# Patient Record
Sex: FEMALE | Race: WHITE | NOT HISPANIC OR LATINO | Employment: FULL TIME | URBAN - METROPOLITAN AREA
[De-identification: names, ages, dates, MRNs, and addresses within clinical notes are randomized per-mention and may not be internally consistent; named-entity substitution may affect disease eponyms.]

---

## 2024-04-24 PROBLEM — E66.812 OBESITY, CLASS II, BMI 35-39.9: Status: RESOLVED | Noted: 2019-05-18 | Resolved: 2024-04-24

## 2024-09-30 ENCOUNTER — HOSPITAL ENCOUNTER (OUTPATIENT)
Dept: RADIOLOGY | Facility: CLINIC | Age: 29
Discharge: HOME | End: 2024-09-30
Payer: COMMERCIAL

## 2024-09-30 ENCOUNTER — OFFICE VISIT (OUTPATIENT)
Dept: ORTHOPEDIC SURGERY | Facility: CLINIC | Age: 29
End: 2024-09-30
Payer: COMMERCIAL

## 2024-09-30 DIAGNOSIS — M25.571 RIGHT ANKLE PAIN, UNSPECIFIED CHRONICITY: ICD-10-CM

## 2024-09-30 DIAGNOSIS — M79.661 PAIN IN RIGHT SHIN: ICD-10-CM

## 2024-09-30 DIAGNOSIS — M72.2 PLANTAR FASCIITIS OF RIGHT FOOT: ICD-10-CM

## 2024-09-30 DIAGNOSIS — M79.671 RIGHT FOOT PAIN: ICD-10-CM

## 2024-09-30 DIAGNOSIS — M67.01 CONTRACTURE OF RIGHT ACHILLES TENDON: Primary | ICD-10-CM

## 2024-09-30 PROCEDURE — 73610 X-RAY EXAM OF ANKLE: CPT | Mod: RT

## 2024-09-30 PROCEDURE — 99203 OFFICE O/P NEW LOW 30 MIN: CPT | Performed by: STUDENT IN AN ORGANIZED HEALTH CARE EDUCATION/TRAINING PROGRAM

## 2024-09-30 PROCEDURE — 99213 OFFICE O/P EST LOW 20 MIN: CPT | Performed by: STUDENT IN AN ORGANIZED HEALTH CARE EDUCATION/TRAINING PROGRAM

## 2024-09-30 PROCEDURE — 73630 X-RAY EXAM OF FOOT: CPT | Mod: RT

## 2024-09-30 PROCEDURE — 73590 X-RAY EXAM OF LOWER LEG: CPT | Mod: RIGHT SIDE | Performed by: RADIOLOGY

## 2024-09-30 PROCEDURE — 73630 X-RAY EXAM OF FOOT: CPT | Mod: RIGHT SIDE | Performed by: RADIOLOGY

## 2024-09-30 PROCEDURE — 73590 X-RAY EXAM OF LOWER LEG: CPT | Mod: RT

## 2024-09-30 PROCEDURE — 73610 X-RAY EXAM OF ANKLE: CPT | Mod: RIGHT SIDE | Performed by: RADIOLOGY

## 2024-09-30 ASSESSMENT — PAIN - FUNCTIONAL ASSESSMENT: PAIN_FUNCTIONAL_ASSESSMENT: 0-10

## 2024-09-30 ASSESSMENT — PAIN DESCRIPTION - DESCRIPTORS: DESCRIPTORS: SHARP;SHOOTING

## 2024-09-30 ASSESSMENT — PAIN SCALES - GENERAL: PAINLEVEL_OUTOF10: 4

## 2024-09-30 NOTE — PROGRESS NOTES
"ORTHOPAEDIC SURGERY HISTORY & PHYSICAL     Chief Complaint:  Right foot pain    History Of Present Illness  Audelia Davis \"Maliha\" is a 28 y.o. female who presents for evaluation of right foot pain, self-referred.  Patient reports nearly 2 months of worsening right foot pain that began atraumatically.  Pain is typically reported as 4-7 out of 10.  Pain is made worse with walking, standing and stretching.  Patient's mother has had a similar pain when she has been self treating for plantar fasciitis, including wearing shoes around the house.  She has not been taking any anti-inflammatory medications.  This is not painful with each step.  She has recently transition to working at home but noticed this pain prior to making this transition.  This is not associated with any numbness, tingling or weakness.     Past Medical History  Past Medical History:   Diagnosis Date    Abnormal weight gain 08/09/2014    Abnormal weight gain    Dysuria 04/24/2024    Malaise 04/24/2024    Nasal turbinate hypertrophy 04/24/2024    Personal history of other infectious and parasitic diseases 08/08/2014    History of infectious mononucleosis    Personal history of other mental and behavioral disorders     History of depression    Personal history of other specified conditions     History of snoring    Rash 04/24/2024       Surgical History  Recent Surgeries in Orthopaedic Surgery            No cases to display             Social History  Social History     Socioeconomic History    Marital status: Single   Tobacco Use    Smoking status: Former     Types: Cigarettes     Passive exposure: Past    Smokeless tobacco: Never   Vaping Use    Vaping status: Never Used   Substance and Sexual Activity    Alcohol use: Not Currently    Drug use: Never    Sexual activity: Yes     Partners: Male       Family History  No family history on file.     Allergies  No Known Allergies    Review of Systems  REVIEW OF SYSTEMS  Constitutional: no unplanned weight " loss  Psychiatric: no suicidal ideation  ENT: no vision changes, no sinus problems  Pulmonary: no shortness of breath  Lymphatic: no enlarged lymph nodes  Cardiovascular: no chest pain or shortness of breath  Gastrointestinal: no stomach problems  Genitourinary: no dysuria   Skin: no rashes  Endocrine: no thyroid problems  Neurological: no headache, no numbness  Hematological: no easy bruising  Musculoskeletal: Right foot pain    Physical Exam  PHYSICAL EXAMINATION  Constitutional Exam: well developed and well nourished  Psychiatric Exam: alert and oriented, appropriate mood and behavior  Eye Exam: EOMI  Pulmonary Exam: breathing non-labored, no apparent distress  Lymphatic exam: no appreciable lymphadenopathy in the lower extremities  Cardiovascular exam: RRR to peripheral palpation, DP pulses 2+, PT 2+, toes are pink with good capillary refill, no pitting edema  Skin exam: no open lesions, rashes, abrasions or ulcerations  Neurological exam: sensation to light touch intact in both lower extremities in peripheral and dermatomal distributions (except for any abnormalities noted in musculoskeletal exam)    Musculoskeletal exam:Right lower extremity examination.  Patient pain localized to the plantar medial aspect of the foot.  She is tender to palpation along the medial longitudinal band of the plantar fascia, this is worsened with hyperdorsiflexion of the toes as well as with focal tenderness to palpation about the Achilles tendon that is worsened with attempted ankle dorsiflexion. Patient has pain-free and restricted ankle range of motion, she lacks approximately 30 degrees of dorsiflexion of the ankle that does not improve with knee flexion, subtalar and midtarsal joint range of motion.  Patient has sensation intact to light touch grossly in a saphenous, sural, superficial peroneal, deep peroneal and tibial nerve distribution.  Patient has 5 out of 5 strength in plantarflexion, dorsiflexion and EHL. Patient has 2+  DP/PT pulse palpated.  Negative calcaneal compression test, negative Tinel's at the posterior tarsal tunnel.    Last Recorded Vitals  There were no vitals taken for this visit.    Laboratory Results    No results found for this or any previous visit (from the past 24 hour(s)).    Radiology Results   X-ray imaging 3 view weightbearing right foot and ankle reviewed from 09/30/2024 and independently evaluated by me demonstrates no acute fracture or dislocation.    Assessment/Plan:  28-year-old female who in my impression has right foot and ankle pain secondary to plantar fasciitis with Achilles tendon contracture.  I have reviewed the diagnosis and treatment options extensively with the patient.  I would recommend the patient continue weightbearing to her tolerance in her right lower extremity.  I have counseled the patient regarding the use of sturdy, supportive and accommodative footwear.  I will provide her with information regarding a silicone heel cup  As well as a formal referral to physical therapy to begin Achilles and plantar fascia stretching.  I reviewed that this is typically an overuse problem and she should  take careful account of her activities so as to avoid pain as well as impact activity.  She has been on a weight loss journey and I discussed the use of crosstraining including fly wheel exercise bike, rowing machine, aquatic therapy as well as elliptical machine to avoid high impact activity.  I will plan to see the patient back in approximately 6 weeks to follow her clinical course.  If she is not clinically improving, consideration could be made for PF CSI as well as MRI right foot to more completely evaluate her plantar fascia.  Upon return, patient would not require further imaging.    Justin Lafleur MD, BISHOP  Department of Orthopaedic Surgery  [facility]    The diagnosis and treatment plan were reviewed with the patient. All questions were answered. The  patient verbalized understanding of the treatment plan. There were no barriers to understanding identified.    Note dictated with eTipping software.  Completed without full type editing and sent to avoid delay.

## 2024-10-23 ENCOUNTER — APPOINTMENT (OUTPATIENT)
Dept: PHYSICAL THERAPY | Facility: CLINIC | Age: 29
End: 2024-10-23
Payer: COMMERCIAL

## 2024-11-06 ENCOUNTER — OFFICE VISIT (OUTPATIENT)
Dept: NEUROLOGY | Facility: HOSPITAL | Age: 29
End: 2024-11-06
Payer: COMMERCIAL

## 2024-11-06 VITALS
HEART RATE: 88 BPM | BODY MASS INDEX: 48.65 KG/M2 | RESPIRATION RATE: 18 BRPM | WEIGHT: 285 LBS | TEMPERATURE: 97.7 F | DIASTOLIC BLOOD PRESSURE: 95 MMHG | HEIGHT: 64 IN | SYSTOLIC BLOOD PRESSURE: 138 MMHG

## 2024-11-06 DIAGNOSIS — G43.009 MIGRAINE WITHOUT AURA AND WITHOUT STATUS MIGRAINOSUS, NOT INTRACTABLE: ICD-10-CM

## 2024-11-06 PROCEDURE — 1036F TOBACCO NON-USER: CPT

## 2024-11-06 PROCEDURE — 99213 OFFICE O/P EST LOW 20 MIN: CPT

## 2024-11-06 PROCEDURE — 3008F BODY MASS INDEX DOCD: CPT

## 2024-11-06 PROCEDURE — 99213 OFFICE O/P EST LOW 20 MIN: CPT | Mod: GC

## 2024-11-06 RX ORDER — RIZATRIPTAN BENZOATE 10 MG/1
10 TABLET ORAL ONCE AS NEEDED
Qty: 9 TABLET | Refills: 11 | Status: SHIPPED | OUTPATIENT
Start: 2024-11-06 | End: 2025-11-06

## 2024-11-06 ASSESSMENT — PAIN SCALES - GENERAL: PAINLEVEL_OUTOF10: 0-NO PAIN

## 2024-11-06 NOTE — PATIENT INSTRUCTIONS
Dear Ms. Davis,     Thank you for your visit today. Please continue taking your rizatriptan as needed for migraines. Please come to the ED if you have weakness, numbness, vision changes concerning for a stroke.     -c/w Rizatriptan 10mg oral (Upto 3 tablets in a day - can only take 6 times a month)    We will see you in 1 year, feel free to call us if your need to see us earlier.      Neurology

## 2024-11-06 NOTE — PROGRESS NOTES
Subjective     Audelia Davis is a 28 y.o. year old female here for follow up of migrianes without aura.     HPI    On todays visit,  Says her migraines occur when the weather changes, every couple weeks.   Says Rizatriptan works well, usually one is good enough to abort the headache. Says she has occasional sweating after taking rizatriptan. Occasional blurry vision with nausea  . Denies aura. No weakness, numbness.     Last seen on 5/8/24:  Audelia started getting headaches at age 11. They occur on left side of the face, with pain behind the eyes, and she feels increased temperature on the left side. It is a stabbing pain 8/10 pain which can evolve to a full stabbing behind the ye if left untreated.  She has some blurred vision  before and during. She also says she has a dizzy feeling. Denies nausea, vomiting but endorses phonophobia and photophobia. She has noticed association with periods (migraines before periods but is on OCPs, now experiencing migraines with intermittent spotting) and weather changes.  Had migraines once every couple weeks but has increased frequency this month with one every 3 days.   Has been on OCPs since she was 14 but has recently noticed breakthrough bleeding and has noticed an associated increase in migraine frequency,   With Naratriptan and 2-3 aspirin migraine goes away in 3-4 hours. Can last on and off for a couple of days if she doesn't treat it.  Says Naratriptan makes her face sweaty and was wondering if there are alternatives.      Says smells and weather changes can trigger headaches.   Mother has history of similar migraines and is being treated with Naratriptan.  Work attendance or other daily activities are affected by the headaches.     Current Acute Headache Treatment Rizatriptan   Current Preventative Headache Treatment None   Previous Acute Headache Treatment Naratriptan        ROS: As per HPI, otherwise all other systems have been reviewed are negative for complaint.       Review of Systems    Patient Active Problem List   Diagnosis    Anxiety    MDD (major depressive disorder)    Menstrual migraine without status migrainosus, not intractable    Migraine without aura and without status migrainosus, not intractable    Nasal septal deviation    Persistent headaches     Past Medical History:   Diagnosis Date    Abnormal weight gain 08/09/2014    Abnormal weight gain    Dysuria 04/24/2024    Malaise 04/24/2024    Nasal turbinate hypertrophy 04/24/2024    Personal history of other infectious and parasitic diseases 08/08/2014    History of infectious mononucleosis    Personal history of other mental and behavioral disorders     History of depression    Personal history of other specified conditions     History of snoring    Rash 04/24/2024     Past Surgical History:   Procedure Laterality Date    OTHER SURGICAL HISTORY  04/04/2022    Corneal lasik    OTHER SURGICAL HISTORY  04/04/2022    [place] tooth extraction     Social History     Tobacco Use    Smoking status: Former     Types: Cigarettes     Passive exposure: Past    Smokeless tobacco: Never   Substance Use Topics    Alcohol use: Not Currently   Says she smokes daily marijuana - daily blunt, says it helps with the migraine.   A cup of coffee daily.    family history is not on file.    Current Outpatient Medications:     fluoxetine HCl (PROZAC ORAL), , Disp: , Rfl:     hydrOXYzine pamoate (Vistaril) 25 mg capsule, TAKE 1 TO 2 CAPSULES BY MOUTH ONCE A DAY AS NEEDED FOR ANXIETY, Disp: , Rfl:     norgestimate-ethinyl estradioL (Ortho-Cyclen) 0.25-35 mg-mcg tablet, Take 1 tablet by mouth once daily. Continuous dosing for menstrual migraines, dispense 4 packs, Disp: 90 tablet, Rfl: 3    rizatriptan (Maxalt) 10 mg tablet, Take 1 tablet (10 mg) by mouth 1 time if needed for migraine (may repeat x1). May repeat in 2 hours if unresolved. Do not exceed 30 mg in 24 hours., Disp: 9 tablet, Rfl: 3  No Known Allergies    Objective   Neurological  Exam  Physical Exam  GENERAL APPEARANCE:  No distress, alert and cooperative.       MENTAL STATE:  Orientation was normal to time, place and person.     CRANIAL NERVES:  Cranial nerves were normal.      CN 2- Visual fields full to confrontation.      CN 3, 4, 6-  No ptosis. EOMs normal alignment, full range of movement, no nystagmus     CN 5- Facial sensation intact bilaterally.      CN 7- Normal and symmetric facial strength. Nasolabial folds symmetric.     CN 8- Hearing intact       CN 9- Palate elevates symmetrically.      CN 11- Normal strength of shoulder shrug      CN 12- Tongue midline, with normal bulk and strength; no fasciculations.     MOTOR:  Motor exam was normal. Muscle bulk and tone were normal in both upper and lower extremities. Muscle strength was 5/5 in distal and proximal muscles in both upper and lower extremities. No fasciculations, tremor or other abnormal movements were present.     REFLEXES:  Right/ Left:  Biceps 2/2, brachioradialis 2/2, triceps 2/2, patellar 3/3 ankle 2/2    SENSORY: Sensory exam was intact to light touch, in both UE and LE.     COORDINATION:  In UE- finger-nose-finger was intact and in LE-     GAIT: Station was stable with a normal base . Stable on tandem gait.     Assessment/Plan   Diagnoses and all orders for this visit:  Migraine without aura and without status migrainosus, not intractable  -     Follow Up In Neurology  Episodic migraine  Audelia Kumar is a 28 y.o. year old female who presents with chief complaint of headaches. Neurological exam was normal. Patient has a migraine without aura every few weeks. Last visit patient was switched from Naratriptan and started on Rizatriptan. Patient reports good control with Rizatriptan. Will continue.      Plan:   -c/w Rizatriptan 10mg oral (Upto 3 tablets in a day - can only take 6 times a month)  -RTC in 2 yo        Hellen Whaley MD  Neurology PGY-3

## 2024-11-15 ENCOUNTER — APPOINTMENT (OUTPATIENT)
Dept: ORTHOPEDIC SURGERY | Facility: CLINIC | Age: 29
End: 2024-11-15
Payer: COMMERCIAL

## 2024-11-15 ENCOUNTER — EVALUATION (OUTPATIENT)
Dept: PHYSICAL THERAPY | Facility: CLINIC | Age: 29
End: 2024-11-15
Payer: COMMERCIAL

## 2024-11-15 DIAGNOSIS — M72.2 PLANTAR FASCIITIS OF RIGHT FOOT: ICD-10-CM

## 2024-11-15 PROCEDURE — 97110 THERAPEUTIC EXERCISES: CPT | Mod: GP

## 2024-11-15 PROCEDURE — 97161 PT EVAL LOW COMPLEX 20 MIN: CPT | Mod: GP

## 2024-11-15 NOTE — PROGRESS NOTES
Physical Therapy    Physical Therapy Evaluation and Treatment    Patient Name: Audelia Davis  MRN: [id number]  Today's Date: 11/15/2024  Time Calculation  Start Time: 1455  Stop Time: 1545  Time Calculation (min): 50 min    Insurance:  Visit number: 1   Authorization info: no auth, $60 copay, 60 PT/OT/SP  Insurance Type: Payor: [employer] / Plan: [employer] HEALTH PLAN / Product Type: *No Product type* /     Current Problem  1. Plantar fasciitis of right foot  Referral to Physical Therapy    Follow Up In Physical Therapy          General  29 yoF presenting to therapy for Right plantarfasciaitis with Achilles tendon contracture    Precautions  Migraines    SUBJECTIVE:   29 yoF presenting to therapy for Right plantarfasciaitis with Achilles tendon contracture  Insidious onset 3 months ago  The pain is located in the back of her Right ankle and heel  Aggravating activities: walking, especially barefoot  Relieving activities:: shoes with softer heels  Consulted with Dr. Lafleur  Xrays (below)  Recommend heel cup and physical therapy  Pt purchased heel cup but hasn't tried it yet    Lives in an apartment; no stairs  Works remote from home  Enjoys video games, reading    Imaging:   Xray Right ankle, tibia, fibula, foot  IMPRESSION:  Small Achilles spur. Otherwise normal radiographs right ankle, foot,  and lower leg.     Pain:   Current: 2/10, tightness in lower leg  Lowest: 0/10  Highest: 6/10    Red flags: None    Patient/Family Goal: less pain in right leg    Outcome Measures: LEFS 65/80    OBJECTIVE:    Posture: Mild pronation bilaterally     Gait: Independent, non-antalgic, no abnormalities    Palpation: no reproducible pain, but mobility restrictions noted throughout gastrocnemius    Right Ankle and Great Toe AROM: Grossly WFL with negative overpressure all planes    Right Ankle Strength: strong and painfree all planes; unilateral heel raise weaker R vs L    Joint Mobility: Talocrural, subtalar WNL     Special Tests:  Windlass negative, DF Lunge negative    Treatments:  Therapeutic Exercise: (10 minutes)   Recommend purchasing massage roller stick for gastoc - emailed link   Issued HEP (below)    Manual Therapy: (10 minutes)  Performed dry needling. Verbal consent obtained. No contraindications present.  Skin cleaned with alcohol prep pad.    Patient positioned in: prone   Needle size and dosage: 0.30 x 0.50 mm x 5  Muscle needled: R medial/lateral gastroc  Reaction: no adverse reactions    IASTM/STM to Right gastrosoleus complex and achilles in prone (after needling)      OP Education: Walking in supportive shoe as tolerated     HEP:  Access Code: [email or web address]  URL: [email or web address]  Date: 11/15/2024  Prepared by: Naina Ruffing    Exercises  - Long Sitting Calf Stretch with Strap  - 2 x daily - 7 x weekly - 1 sets - 5 reps - 20-30 hold  - Seated Soleus Stretch  - 2 x daily - 7 x weekly - 3 sets - 5 reps - 20-30 hold  - Standing Heel Raises  - 2 x daily - 7 x weekly - 3 sets - 10-15 reps  - Single Leg Stance with Support  - 2 x daily - 7 x weekly - 1 sets - 10 reps - 10 hold    Response to treatment: good    ASSESSMENT:   29 yoF presenting to therapy for Right plantarfasciaitis with Achilles tendon contracture. Symptoms improving. Clinical findings indicated restricted mobility and weakness of medial/lateral heads of gastronemius. Dry needling and IASTM performed today and this was helpful. Encouraged pt to purchase massage roller stick so she can self massage her own calf. Also instructed in HEP. Encourage walking as tolerated in supportive shoe. Follow up in 3 weeks.     PLAN:  OP PT PLAN:  Treatment/Interventions: Dry Needling  , Electrical Stimulation , Gait training , Manual Therapy  , Neuromuscular re-education , Taping techniques , and Therapeutic exercise    PT Plan: Skilled PT   PT Frequency: 1 visit every 2-3 weeks  Duration:1-4 visits pending progress  Visits Approved: 60  Rehab Potential: Good  Plan of Care  Agreement: Patient         Goals:   Pt will report 1/10 pain at worst when walking 60+ minutes in supportive tennis shoe  Right unilateral heel raise will be symmetrical to Left heel raise to indicate improvements in strength  Pt will report compliance with HEP to allow for discharge to HEP

## 2024-12-06 ENCOUNTER — APPOINTMENT (OUTPATIENT)
Dept: ORTHOPEDIC SURGERY | Facility: CLINIC | Age: 29
End: 2024-12-06
Payer: COMMERCIAL

## 2024-12-18 ENCOUNTER — TREATMENT (OUTPATIENT)
Dept: PHYSICAL THERAPY | Facility: CLINIC | Age: 29
End: 2024-12-18
Payer: COMMERCIAL

## 2024-12-18 DIAGNOSIS — M72.2 PLANTAR FASCIITIS OF RIGHT FOOT: ICD-10-CM

## 2024-12-18 PROCEDURE — 97110 THERAPEUTIC EXERCISES: CPT | Mod: GP

## 2024-12-18 PROCEDURE — 97140 MANUAL THERAPY 1/> REGIONS: CPT | Mod: GP

## 2024-12-18 NOTE — PROGRESS NOTES
Physical Therapy  Physical Therapy Treatment Note    Patient Name: Audelia Davis  MRN: [id number]  Today's Date: 12/19/2024  Time Calculation  Start Time: 1120  Stop Time: 1200  Time Calculation (min): 40 min  PT Therapeutic Procedures Time Entry  Manual Therapy Time Entry: 15  Therapeutic Exercise Time Entry: 25        Insurance:  Visit number: 2   Authorization info: no auth, $60 copay, 60 PT/OT/SP   Insurance Type: Payor: [employer] / Plan: [employer] HEALTH PLAN / Product Type: *No Product type* /   Current Problem  1. Plantar fasciitis of right foot  Follow Up In Physical Therapy        General  29 yoF presenting to therapy for Right plantarfasciaitis with Achilles tendon contracture     Precautions  Migraines    Subjective:   Patient reports the stretches issued have been helpful. Less pain in Right heel but her Right hamstring has been feeling more tight. Occasional pain in Right heel, but rates the pain as mild. She has been wearing her heel cup and this is also helpful.    Pain  No pain currently    Objective:   Objective   Posture: Mild pronation bilaterally      Gait: Independent, non-antalgic, no abnormalities     Palpation: no reproducible pain, but mobility restrictions noted throughout gastrocnemius     Right Ankle and Great Toe AROM: Grossly WFL with negative overpressure all planes     Right Ankle Strength: strong and painfree all planes; unilateral heel raise weaker R vs L     Joint Mobility: Talocrural, subtalar WNL      Special Tests: Windlass negative, DF Lunge negative    Treatments:     THERE EX 25   Reviewed HEP  - Long sit calf stretch with strap: continue; increase trunk flexion to increase hamstring stretch  - Seated soleus stretch: continue as prescribed  - Standing double leg heel raise: can continue or progress to single leg 4 x 5 reps as able  - Single leg balance, firm: continue; increased duration from 10 sec to 20 sec    MANUAL 15   Performed dry needling. Verbal consent obtained. No  contraindications present.  Skin cleaned with alcohol prep pad.     Patient positioned in: prone   Needle size and dosage: 0.30 x 0.50 mm x 5  Muscle needled: R medial/lateral gastroc  Reaction: no adverse reactions     IASTM/STM to Right gastrosoleus complex and achilles in prone (after needling)  NMRE    THERE ACT    Assessment:  Good response to physical therapy demonstrating an improvement in pain and function. Updated HEP today. Dry needling and manual therapy to improve calf soft tissue mobility. Pt pleased with her progress so far. Wishes to work independently and will contact our clinic as needed     PLAN:  Work independently; call as needed    OP PT PLAN:  Treatment/Interventions: Dry Needling  , Electrical Stimulation , Gait training , Manual Therapy  , Neuromuscular re-education , Taping techniques , and Therapeutic exercise    PT Plan: Skilled PT   PT Frequency: 1 visit every 2-3 weeks  Duration:1-4 visits pending progress  Visits Approved: 60  Rehab Potential: Good  Plan of Care Agreement: Patient     Goals:   Pt will report 1/10 pain at worst when walking 60+ minutes in supportive tennis shoe  Right unilateral heel raise will be symmetrical to Left heel raise to indicate improvements in strength  Pt will report compliance with HEP to allow for discharge to HEP

## 2025-01-07 ENCOUNTER — OFFICE VISIT (OUTPATIENT)
Dept: URGENT CARE | Age: 30
End: 2025-01-07
Payer: COMMERCIAL

## 2025-01-07 VITALS
SYSTOLIC BLOOD PRESSURE: 163 MMHG | HEART RATE: 95 BPM | HEIGHT: 64 IN | TEMPERATURE: 96.4 F | DIASTOLIC BLOOD PRESSURE: 100 MMHG | BODY MASS INDEX: 48.65 KG/M2 | OXYGEN SATURATION: 97 % | WEIGHT: 285 LBS

## 2025-01-07 DIAGNOSIS — R68.89 FLU-LIKE SYMPTOMS: ICD-10-CM

## 2025-01-07 DIAGNOSIS — R11.0 NAUSEA: ICD-10-CM

## 2025-01-07 DIAGNOSIS — B34.9 VIRAL INFECTION: Primary | ICD-10-CM

## 2025-01-07 LAB
POC RAPID INFLUENZA A: NEGATIVE
POC RAPID INFLUENZA B: NEGATIVE

## 2025-01-07 PROCEDURE — 87804 INFLUENZA ASSAY W/OPTIC: CPT | Performed by: PHYSICIAN ASSISTANT

## 2025-01-07 PROCEDURE — 99204 OFFICE O/P NEW MOD 45 MIN: CPT | Performed by: PHYSICIAN ASSISTANT

## 2025-01-07 PROCEDURE — 3008F BODY MASS INDEX DOCD: CPT | Performed by: PHYSICIAN ASSISTANT

## 2025-01-07 RX ORDER — ONDANSETRON 4 MG/1
4 TABLET, FILM COATED ORAL EVERY 8 HOURS PRN
Qty: 20 TABLET | Refills: 0 | Status: SHIPPED | OUTPATIENT
Start: 2025-01-07 | End: 2025-01-14

## 2025-01-07 ASSESSMENT — PAIN SCALES - GENERAL: PAINLEVEL_OUTOF10: 3

## 2025-01-07 NOTE — PROGRESS NOTES
"Subjective   Patient ID: Audelia Davis \"Maliha\" is a 29 y.o. female. They present today with a chief complaint of Other (Patient c/o diarrhea, vomiting, chills, body aches. ).    History of Present Illness  Patient is a pleasant 79-year-old white female, past medical history of anxiety, presented to clinic with complaint of flulike symptoms.  Patient is reporting 2 to 3-day history of nausea with associated nonbloody nonbilious emesis yesterday approximately 3 episodes.  She is also reporting some loose bowel movements.  She is reporting chills and generalized bodyaches as well.  No chest pain or shortness of breath.  No abdominal pain.  No headache lightheadedness or dizziness.  No neck pain or stiffness.  No light sensitivity.  She does report some very mild bodyaches.  No numbness tingling or focal weakness.  Patient states he did do a COVID-19 test at home and was negative.  Rapid influenza testing was performed and returned negative.  As noted she had a negative COVID-19 test at home.          Past Medical History  Allergies as of 01/07/2025    (No Known Allergies)       (Not in a hospital admission)         Past Medical History:   Diagnosis Date    Abnormal weight gain 08/09/2014    Abnormal weight gain    Dysuria 04/24/2024    Malaise 04/24/2024    Nasal turbinate hypertrophy 04/24/2024    Personal history of other infectious and parasitic diseases 08/08/2014    History of infectious mononucleosis    Personal history of other mental and behavioral disorders     History of depression    Personal history of other specified conditions     History of snoring    Rash 04/24/2024       Past Surgical History:   Procedure Laterality Date    OTHER SURGICAL HISTORY  04/04/2022    Corneal lasik    OTHER SURGICAL HISTORY  04/04/2022    [place] tooth extraction        reports that she has quit smoking. Her smoking use included cigarettes. She has been exposed to tobacco smoke. She has never used smokeless tobacco. She " "reports that she does not currently use alcohol. She reports that she does not use drugs.    Review of Systems  Review of Systems                               Objective    Vitals:    01/07/25 0858   BP: (!) 163/100   Pulse: 95   Temp: 35.8 °C (96.4 °F)   SpO2: 97%   Weight: 129 kg (285 lb)   Height: 1.626 m (5' 4\")     No LMP recorded.    Physical Exam  General: Vitals Noted. No distress. Normocephalic.     HEENT: TMs normal, EOMI, normal conjunctiva, patent nares with erythematous edematous and appear nasal turbinates and clear rhinorrhea bilaterally.  Posterior oropharynx with signs of postnasal drainage without any erythema swelling or tonsillar exudate.  Uvula is in the midline and nonedematous.  No drooling.  No trismus.    Neck: Supple with no adenopathy.     Cardiac: Regular Rate and Rhythm. No murmur.     Pulmonary: Equal breath sounds bilaterally. No wheezes, rhonchi, or rales.    Abdomen: Soft, non-tender, with normal bowel sounds.     Musculoskeletal: Moves all extremities, no effusion, no edema.     Skin: No obvious rashes.  Procedures    Point of Care Test & Imaging Results from this visit  Results for orders placed or performed in visit on 01/07/25   POCT Influenza A/B manually resulted    Collection Time: 01/07/25  9:34 AM   Result Value Ref Range    POC Rapid Influenza A Negative Negative    POC Rapid Influenza B Negative Negative      No results found.    Diagnostic study results (if any) were reviewed by Dusty Dowling PA-C.    Assessment/Plan   Allergies, medications, history, and pertinent labs/EKGs/Imaging reviewed by Dusty Dowling PA-C.     Medical Decision Making  Patient was seen eval in the clinic with complaint of flulike symptoms.  On exam patient is nontoxic very well-appearing resting bed comfortably no acute distress.  Vital signs are stable, afebrile.  Chest is clear, heart is regular, belly is diffusely soft and nontender with no guarding rebound or rigidity.  Patient's " symptoms and physical exam consistent with a flulike illness.  Will provide Zofran abuse as needed for her nausea and advised to drink plenty of clear fluids.  Advised to transition her diet as tolerated beginning with a bland diet.  Advise follow-up with primary care physician in the next week.  Will be discharged home at this time.  I reviewed my impression, plan, strict return versus report to ED precautions with the patient.  She expresses understanding and agreement plan of care.    Orders and Diagnoses  Diagnoses and all orders for this visit:  Flu-like symptoms  -     POCT Influenza A/B manually resulted        Medical Admin Record      Follow Up Instructions  No follow-ups on file.    Patient disposition: Home    Electronically signed by Dusty Dowling PA-C  9:49 AM

## 2025-02-11 ENCOUNTER — APPOINTMENT (OUTPATIENT)
Dept: ORTHOPEDIC SURGERY | Facility: CLINIC | Age: 30
End: 2025-02-11
Payer: COMMERCIAL

## 2025-11-06 ENCOUNTER — APPOINTMENT (OUTPATIENT)
Dept: DERMATOLOGY | Facility: CLINIC | Age: 30
End: 2025-11-06
Payer: COMMERCIAL